# Patient Record
(demographics unavailable — no encounter records)

---

## 2017-03-08 NOTE — HISTORY & PHYSICAL EXAMINATION
DATE OF ADMISSION:  2017

 

PRINCIPAL DIAGNOSIS:  Intrauterine pregnancy at term, fetal macrosomia, prior

 section for breech presentation.

 

PRINCIPAL PROCEDURE:  Repeat low transverse cervical  section.

 

HISTORY OF PRESENT ILLNESS:  The patient is a 31-year-old  2, para

1-0-0-1 white female who presents for repeat  section at 39 weeks. 

Her prior  section was done because of persistent breech presentation

for delivery of a 10-pound 6-ounce viable female infant at 39 weeks.  She now

presents for repeat  section.  This pregnancy was complicated by

pregnancy as a result of IVF.  Last ultrasound for growth showed a macrosomic

infant with normal CARLOZ.  NSTs and AFIs have all been within normal limits. 

The patient is requesting repeat  section.  She understands the risks

of procedure and is willing to proceed.

 

PAST MEDICAL HISTORY:  Significant for history of migraine headaches.

 

PAST SURGICAL HISTORY:  She has had her wisdom teeth removed, a 

section performed in  for breech presentation.  Infant weighed 10 pounds

4 ounces, no complications following the surgery.

 

ALLERGIES:  She has no known drug allergies.

 

OBSTETRICAL AND GYNECOLOGICAL HISTORY:  Periods are regular every 28 days. 

No history of PID, VD or herpes.  Pap smears have been within normal limits,

although she had a colposcopy in .  Pap smears since then have been

normal.  This pregnancy is as a result of IVF done in [place], [place].

 

MEDICATIONS:  Prenatal vitamins.

 

PRENATAL HISTORY:  Blood type is A positive.  Antibody screen is negative. 

Rubella is immune.  Varicella is immune.  RPR is nonreactive.  Hepatitis is

negative.  HIV is negative.  Hemoglobin at 28 weeks was 11.0, hematocrit

32.3.  One-hour Glucola was normal at 16 weeks; however, was elevated at 28

weeks.  Followup 2-hour glucose tolerance test was within normal limits.  GC

and chlamydia were negative.  GBS screen was also negative.  Anatomy scan was

complete.  Ultrasounds for growth as noted above.

 

SOCIAL HISTORY:  She does not smoke or drink.

 

FAMILY HISTORY:  Noncontributory.

 

PHYSICAL EXAMINATION:

GENERAL:  She is a well-nourished, well-developed white female in no apparent

distress.

VITAL SIGNS:  Blood pressure is 112/70.

LUNGS:  Clear to auscultation.

HEART:  Regular rate and rhythm.  No murmurs or gallops.

ABDOMEN:  Gravid, fundal height is 40 cm.  Pelvic exam was deferred.  There

is no hepatosplenomegaly or masses palpable.  She has a well-healed low

transverse skin incision on her abdomen.

EXTREMITIES:  Show trace edema.  No calf tenderness.

 

ASSESSMENT:  A 31-year-old  2, para 1-0-0-1 white female, now presents

for repeat  section.  Prior  section was done because of

persistent breech presentation.  The patient understands the risks of the

procedure, all questions have been answered to her satisfaction.  Please see

the orders for further directions.

## 2017-03-08 NOTE — PAT MEDICATION INSTRUCTIONS
Service Date


Mar 8, 2017.





Current Home Medication List


Ferrous Sulfate (Iron (Ferrous Sulfate)), Unknown Dose 3XWK


Multivit/Min/Iron/Fol Ac/Pren (Prenatal Vitamin), 1 TAB PO HS





Medication Instructions


For Your Scheduled Surgery 





- Take the following medications as scheduled the night before surgery:


Multivit/Min/Iron/Fol Ac/Pren (Prenatal Vitamin), 1 TAB PO HS


Ferrous Sulfate (Iron (Ferrous Sulfate)), Unknown Dose 3XWK (M-W-F)





If you have any questions please call us at [phone] (Melanie De Souza PA-C)

 or [phone] or [phone]

## 2017-03-09 NOTE — [facility] OPERATIVE REPORT
Operative Report


Operative Date


Mar 9, 2017.





Pre-Operative Diagnosis





Previous Caesarean Section for Breech Presentation; Desires Repeat Caesarean 


Section.





Post-Operative Diagnosis


same plus delivery of viable female 10 lbs 10ozs.





Procedure(s) Performed


repeat [place]





Surgeon


Dr. Lieberman





Assistant Surgeon(s)


Dr. Frankel





Estimated Blood Loss


700 ml





Findings


normal tubes & ovaries , gravid term uterus





Fluids


3000





Specimens





Cord Blood





Placenta (Hold)





Drains


Steve to straight drainage, clear urine at end of case





Anesthesia


SAB





Complication(s)


None





Disposition


L&D


I attest to the content of the Intraoperative Record and any orders documented 

therein.  Any exceptions are noted below.

## 2017-03-09 NOTE — OPERATIVE REPORT
DATE OF OPERATION:  2017

 

SURGEON:  Dr. Melanie Lieberman.

 

ASSISTANT:  Dr. Gallito Fox, PGY 1.

 

PREOPERATIVE DIAGNOSES:

1.  Intrauterine pregnancy at 39 weeks.

2.  History of fetal macrosomia, prior  section for breech

presentation.

 

POSTOPERATIVE DIAGNOSES:  Same plus delivery of a viable female infant, 10

pounds 10 ounces.

 

PROCEDURE:  Repeat low transverse  section.

 

ANESTHESIA:  Subarachnoid block.

 

BLOOD LOSS:  700 mL.

 

HISTORY:  The patient is a 31-year-old  2, para 1-0-0-1 white female

who presents for repeat  section at 39 weeks.  Her first 

section was done because of persistent breech presentation, that infant

weighed 10 pounds 6 ounces at 39 weeks.  She now presents for repeat 

section.  She understands the risks of procedure and is willing to proceed.

 

GROSS FINDINGS:  Uterus is gravid and consistent with a term pregnancy in

size.  Bilateral ovaries and fallopian tubes are grossly normal.  The lower

uterine segment was noted to be thin but no obvious window was present.

 

DESCRIPTION OF PROCEDURE:  After the patient received adequate subarachnoid

block, she was prepped and draped in the usual sterile fashion.  A low

transverse skin incision was made with the scalpel and carried to the fascia

with the same scalpel.  The fascial incision was then extended with [place]

scissors and the edges grasped with Kocher clamps and the underlying rectus

muscles bluntly and sharply dissected over the overlying fascia.  The rectus

muscle was bluntly divided along the midline and the underlying peritoneum

elevated bluntly.  The bladder was then taken down off the anterior surface

of the uterus and placed behind the bladder blade.  The lower uterine segment

was entered with the scalpel and extended transversely.  The infant was

delivered from the vertex presentation with moderate fundal pressure.  Mouth

and nasopharynx were suctioned on the perineum.  There was spontaneous crying

and the infant was moving all 4 limbs.  The cord was clamped and cut and the

infant was handed off to Dr. Ponce who was in attendance as pediatrician. 

The placenta was then manually removed.  There were retained membranes

present, these were removed using ring forceps and then ultimately a banjo

curette.  At this point, the uterine cavity was noted to be free of any

placental tissue or membranes.  The uterus was then closed in a running

locking imbricating fashion with 0 Monocryl in 2 layers.  An extension

inferiorly on the right side of the incision was repaired the same way. 

Hemostasis was noted to be excellent at this point.  The posterior cul-de-sac

was irrigated with normal saline.  The incision was examined once more and

continued to have excellent hemostasis.  The uterus was placed back inside

the abdominal cavity.  The gutters were explored and found to be free of clot

and fluid.  The anterior cul-de-sac was then irrigated with normal saline. 

The rectus muscles were then closed in the midline with individual stitches

of 0 Monocryl.  The fascia was closed in a running fashion with 0 Vicryl. 

The adipose layer was then irrigated with normal saline.  The skin edges were

reapproximated using a subcuticular stitch of 4-0 Vicryl.  Urine was clear at

the end of the case.  Mother and infant tolerated the procedure well and was

stable upon arrival in recovery.

 

 

I attest to the content of the Intraoperative Record and any orders documented therein. Any exceptio
ns are noted below.

## 2017-03-10 NOTE — DISCHARGE INSTRUCTIONS
Discharge Instructions


Date of Service


Mar 10, 2017.





Admission


Reason for Admission:  Previous  Section





Discharge


Discharge Diagnosis / Problem:  Spontaneous Vaginal Delivery





Discharge Goals


Goal(s):  Routine recovery after delivery





Medications


Continue Dispensed Medications:  supercream, dermaplast, tucks, lansinoh





Activity Recommendations


Activity Limitations:  per Instructions/Follow-up section





.





Instructions / Follow-Up


Instructions / Follow-Up





ACTIVITY RECOMMENDATIONS:





* Gradual return to full activity over the next 2-3 weeks.


* No lifting - nothing heavier than baby over the next 2-3 weeks.


* Do not engage in vigorous exercise, sexual activity or sports until cleared by


   your physician.


* Do not drive or operate any motorized equipment until cleared by your 

physician.


* You may shower/bathe daily.








MEDICATIONS:





For discomfort or pain, you may use Acetaminophen (Tylenol), Ibuprofen (Advil),


or Naproxen (Aleve) following the package directions. For constipation you may 


use Colace following the package directions.








BREAST CARE:





If you are not breast feeding:





*  Wear a supportive bra 24 hours a day for one to two weeks.


*  Avoid stimulating your breasts and nipples as much as possible during the 

first 


    few weeks after delivery.


*  When taking a shower, have the warm water hit your back, not breasts.


*  When your breasts feel full, apply ice packs.  Usually three to four times a 

day


    helps ease the discomfort.


*  Take a mild pain medication (Tylenol / Motrin) when you are uncomfortable.





If breast feeding:





*  Use breast milk to lubricate nipples.  Lansinoh cream may be used for sore 

nipples. 


    You do not need to remove cream prior to breast feeding.  If using a 

different brand


   of cream, check the label for directions regarding removal of cream prior to 

nursing.


*  Wear a supportive bra.


*  If having problems with breasts or breast feeding, call a lactation 

consultant 


    or your health care provider.








EPISIOTOMY CARE:





After delivery, if you have an episiotomy (stitches), the following steps will 

ease


discomfort and aid healing.





*  For the first 24 hours after delivery, place ice packs next to your 

episiotomy to


   help reduce swelling.


*  After the first 24 hour-period, sitz baths, either portable or in the tub, 

are suggested.


    A shower with a shower arm sprayed over the episiotomy may be comforting.


*  [place] care should be done after each voiding and bowel movement.  Squirt warm 

water


    from a plastic bottle over the perineum (region of the body between the 

anus and 


    urinary opening) and pat dry.


*  Use Dermoplast to ease discomfort.  Shake container.  Spray directly over 

the 


    episiotomy.  Place a Tucks on a clean sanitary pad next to your episiotomy.








SPECIAL CARE INSTRUCTIONS:





When you are discharged from the hospital, it is important for you to follow 

the instructions 


listed below:





*  During the first week at home, you should be able to care for yourself and 

your baby.


    In addition, the usual light household activities are encouraged.





*  Limit your activities to the way you feel.  Do not try to clean the house or 

move 


    furniture. Be sensible.





*  If you actively engage in sports and have done so up until the time of your 

delivery, 


    you may resume these activities as soon as you feel able.  This may take up 

to one 


    month or even longer.  Use good judgment.





*  Continue to take your prenatal vitamins for at least six weeks after the 

birth of your baby.





*  Your diet need not be limited unless you were on a special diet before your 

delivery.  


    Breast-feeding mothers need around 2500 calories per day and at least 64-80 

ounces of 


    fluid per day (8 to 10 glasses).





*  You should eat foods from the four major food groups.  Crash diets or fad 

diets are to be 


    avoided.  Eating lean meats, fresh fruits and vegetables, low-fat dairy 

products, high fiber


    foods and a regular exercise program, will help you get back to your pre-

pregnancy weight


    without putting your health at risk.





*  Constipation is sometimes a problem after delivery.  Take a mild laxative as 

needed.  If 


    breast feeding, Milk of Magnesia is acceptable to use. You may use a 

suppository or Fleets


    enema if no episiotomy.





*  A daily shower or tub bath is suggested.  Be sure to thoroughly and gently 

dry the perineum.





*  A bloody vaginal discharge will usually continue until around four weeks 

post partum.  A 


    small amount of bleeding may continue for as long as six weeks.  Vaginal 

discharge changes


    from the bright red bleeding after delivery to pink then brownish and 

finally yellowish-pink 


    before becoming white and disappearing.





*  Bleeding may increase with activity.  Your first period may come in 4-8 

weeks.  If you are 


    breast feeding, your period may be delayed even longer.





*  [place] (sex) can begin whenever both you and your partner feel 

comfortable and do 


    not have any form of genital infection.  It is recommended that you wait at 

least six weeks


    for internal and external healing to occur.  If you have questions, please 

talk to your health


    care practitioner.  A condom should be used to prevent infection and 

pregnancy.





*  Foreplay, gentle intercourse and lubrication is very important the first 

several times to 


    prevent pain.  A water-based lubricant such as K-Y jelly or Astroglide may 

be used.





*  If you have RH negative blood and your baby is RH positive, you will receive 

RHOGAM by 


    injection prior to discharge.  The nurse will give you a card to keep with 

you that has the


    date and place that you received RHOGAM after delivery.





*  During your prenatal care, you had a Rubella screen done to check for the 

presence of 


    rubella antibodies in your blood.  If your test was negative, you will 

receive a Rubella 


    vaccine prior to discharge.  This vaccine may cause a fever, soreness at 

the injection site


    and flu-like symptoms.  If these symptoms persist, notify your health care 

practitioner.  


    Pregnancy is not advised for one month after a Rubella vaccine.





*  Verbalizes understanding of car seat law as reviewed with patient nursing.





*  Car Seat hand-out given and reviewed with patient by nursing.





*  Shaken baby information reviewed with patient by nursing.


 


Call you doctor if:





*  Heavy bleeding (saturating several pads an hour) or passing clots the size 

of your fist.


*  A fever >101 degrees F (38.3 degrees C) on two occasions four hours apart and

/or chills.


*  Unusual pain in the pelvic or vaginal areas.


* "Baby Blues" lasting longer than two weeks.





If you have any questions or concerns, call your health care practitioner at 


[phone].








FOLLOW UP VISIT:





*  Please call the office at [phone] to schedule a 6 week postpartum 


    examination.  It is important you keep this appointment.  It is important 


    for you to make arrangements for either yearly or twice yearly check-ups 


    thereafter.





Current Hospital Diet


Patient's current hospital diet: Regular OB Diet





Discharge Diet


Recommended Diet:  Regular Diet





Procedures


Procedures Performed:  


Live Female Infant at 0754





Pending Studies


Studies pending at discharge:  no





Medical Emergencies








.


Who to Call and When:





Medical Emergencies:  If at any time you feel your situation is an emergency, 

please call 911 immediately.





.





Non-Emergent Contact


Non-Emergency issues call your:  Primary Care Provider, Gynecologist





.


.





"Provider Documentation" section prepared by Daniel Frankel.





VTE Core Measure


Inpt VTE Proph given/why not?:  Treatment not indicated

## 2017-03-10 NOTE — PROGRESS NOTE
Subjective


Mar 10, 2017.


Subjective


conversation w/ patient, physical exam


Ambulation:  ambulating normally


Voiding:  no voiding problems


Passing Gas:  Yes


Diet Tolerance:  Regular Diet


Lochia:  Small


Feeding Type:  Breast Feeding





Review of Systems


Constitutional:  + chills, + fever


Respiratory:  + cough, + shortness of breath


Cardiac:  + chest pain


Abdomen:  + nausea, + pain, + vomiting





Objective


Vital Signs











  Date Time  Temp Pulse Resp B/P Pulse Ox O2 Delivery O2 Flow Rate FiO2


 


3/10/17 04:00 36.6 70 18 110/70  Room Air  


 


3/10/17 00:01     99 Room Air  


 


3/10/17 00:01   18  99   


 


3/10/17 00:01 36.7 76 18 106/63 99 Room Air  


 


3/9/17 23:00   18  99   


 


3/9/17 22:00   20  99   


 


3/9/17 21:00   18  99   


 


3/9/17 20:00 36.7 73 20 108/67 99 Room Air  


 


3/9/17 20:00   18  99   


 


3/9/17 18:00   20  97   


 


3/9/17 17:00   20  97   


 


3/9/17 16:00   19  97   


 


3/9/17 15:30     99 Room Air  


 


3/9/17 15:30 36.7 75 20 121/80 97 Room Air  


 


3/9/17 15:00   18  99   


 


3/9/17 15:00   20  97   


 


3/9/17 14:05   18  99   


 


3/9/17 13:05   20  99   


 


3/9/17 12:10 36.9 69 18 116/76    


 


3/9/17 12:05   18  99   


 


3/9/17 11:05      Room Air  


 


3/9/17 11:05 36.9 64 18 128/81  Room Air  


 


3/9/17 11:05   18  99   











Physical Exam


General Appearance:  WELL-APPEARING, NO APPARENT DISTRESS


Respiratory/Chest:  lungs clear, normal breath sounds


Cardiovascular:  regular rate, rhythm, no murmur


Abdomen:  non tender, soft


Fundus:  Firm, Non-Tender, Relation to Umbilicus (2 cm below)


Incision Description:  Clean, Dry & Intact


Extremities:  non-tender, no calf tenderness





Laboratory Results





Last 24 Hours








Test


  3/10/17


05:55


 


Hemoglobin 10.6 g/dL 


 


Hematocrit 32.2 % 











Assessment and Plan


Post-Op


Day#:  1


Continue Routine Care:


Resident Physician Supervision Note:


I interviewed and examined the patient. Discussed with Dr. Frankel and agree 

with findings and plan as documented in the note. Any exceptions or 

clarifications are listed here: [None]





Documented By:  Melanie Lieberman


S/DENIS  Day 1





- vital signs reviewed and wnl


- HGB 10.6 today


- Blood: A+, GBS-, Rubella immune


- Encourage ambulation, encourage breast feeding, and monitor lochia


- Patient doing well clinically


- CONTINUE ROUTINE POST C- Section CARE

## 2017-03-11 NOTE — PROGRESS NOTE
Subjective


Mar 11, 2017.


Subjective


conversation w/ patient


Ambulation:  ambulating normally


Voiding:  no voiding problems


Passing Gas:  Yes


Diet Tolerance:  Regular Diet


Lochia:  Small


Feeding Type:  Breast Feeding





Review of Systems


Constitutional:  No chills, No fever


Respiratory:  No cough, No shortness of breath


Cardiac:  No chest pain, No palpitations





Objective


Vital Signs











  Date Time  Temp Pulse Resp B/P Pulse Ox O2 Delivery O2 Flow Rate FiO2


 


3/10/17 23:10 36.7 81 16 123/83 97 Room Air  


 


3/10/17 23:10     97 Room Air  


 


3/10/17 15:30     96 Room Air  


 


3/10/17 15:30 36.8 72 18 121/80  Room Air  


 


3/10/17 11:10 36.7 72 14 112/70 98 Room Air  


 


3/10/17 08:45      Room Air  


 


3/10/17 07:30 36.8 70 16 109/66 96 Room Air  











Physical Exam


General Appearance:  WELL-APPEARING, NO APPARENT DISTRESS


Respiratory/Chest:  lungs clear, no respiratory distress


Cardiovascular:  regular rate, rhythm, no murmur


Abdomen:  non tender, soft


Fundus:  Firm, Non-Tender, Relation to Umbilicus (2 cm below)


Incision Description:  Clean, Dry & Intact


Extremities:  non-tender, no calf tenderness





Laboratory Results





Last 24 Hours








Test


  3/11/17


06:00











Assessment and Plan


Post-Op


Day#:  2


Continue Routine Care:


S/P  Day 2





- vital signs reviewed and wnl


- HGB 10.6 yesterday


- Blood: A+, GBS-, Rubella immune


- Encourage ambulation, encourage breast feeding, and monitor lochia


- Patient doing well clinically


- Reviewed discharge instructions with patient


- PATIENT TO BE DISCHARGED TODAY





Resident Physician Supervision Note:


I interviewed and examined the patient. Discussed with Dr. Frankel and agree 

with findings and plan as documented in the note. Any exceptions or 

clarifications are listed here: [None]





Documented By:  Gabriel Herbert

## 2017-03-11 NOTE — DISCHARGE SUMMARY
DATE OF DISCHARGE:  2017.  

 

PRINCIPAL DIAGNOSIS:  Intrauterine pregnancy at 39 weeks, prior 

section and history of fetal macrosomia.

 

PRINCIPAL PROCEDURE:  Repeat low transverse cervical  section.

 

HISTORY OF PRESENT ILLNESS:  The patient is a 31-year-old  2, para

1-0-0-1 white female who presented at 39 weeks for repeat  section. 

This was done without complications with delivery of a viable female infant,

10 pounds 10 ounces.  She had an uncomplicated postop course.  She remained

afebrile throughout her hospital stay.  Pain  was well controlled with p.o.

pain meds.  Hemoglobin on admission was 11.5, hematocrit 34.0.  Second postop

day hemoglobin 9.6, hematocrit 29.3.  She was sent home in good condition

with prescriptions for Percocet 1-2 tablets p.o. q. 4 hours p.r.n. pain,

Motrin 600 mg p.o. q. 4 hours p.r.n. pain.  She is to call for a temperature

of 101 degrees or higher, heavy vaginal bleeding, burning with urination,

increased redness, drainage or pain from her incision, calf tenderness or any

other concerns.  She will be seen in the office in 6 weeks for postpartum and

postop exam.

## 2018-04-27 NOTE — EMERGENCY ROOM VISIT NOTE
History


First contact with patient:  05:52


Chief Complaint:  VOMITING


Stated Complaint:  VOMITING





History of Present Illness


The patient is a 32 year old female who presents to the Emergency Room with 

complaints of nausea, vomiting, diarrhea for the past 7 hours.  Her children 

have been sick recently.  No bad food exposure.  No well water.  No recent 

antibiotics.  Patient denies chest pain, dyspnea, fever, chills, localized 

abdominal pain, blood or black in the emesis or stool, urinary problems.  

Patient cannot keep fluids down.





Review of Systems


An 10 system review of systems was completed with positives and pertinent 

negatives listed in the HPI.





Past Medical/Surgical History


Medical Problems:


(1) Pregnancy with 39 completed weeks gestation


Surgical Problems:


(1) Previous  section complicating pregnancy, antepartum condition or 

complication








Social History


Smoking Status:  Never Smoker


Smokeless Tobacco Use:  No


Alcohol Use:  none


Drug Use:  none


Marital Status:  


Housing Status:  lives with family


Occupation Status:  employed





Current/Historical Medications


No Active Prescriptions or Reported Meds





Physical Exam


Vital Signs











  Date Time  Temp Pulse Resp B/P (MAP) Pulse Ox O2 Delivery O2 Flow Rate FiO2


 


18 08:23  88 16 108/75 100   


 


18 07:44  79  108/64 99 Room Air  


 


18 05:46 36.7 89 20 114/77 98 Room Air  











Physical Exam


VITALS: Vitals are noted on the nurse's note and reviewed by myself.  Vital 

signs stable.


GENERAL: Pleasant female actively vomiting, in no acute distress, nondiaphoretic

, well-developed well-nourished.


SKIN: Capillary reflex less than 2 seconds.


HEENT: Normocephalic.  PERRLA.  EOMI.  Nares patent.  Mucous membranes mildly 

dry.  Neck is supple without nuchal rigidity.


HEART: Regular rate and rhythm without murmurs gallops or rubs.


LUNGS: Clear to auscultation bilaterally without wheezes, rales or rhonchi.  No 

retractions or accessory muscle use.


ABDOMEN: Positive bowel sounds x 4.  Normal tympanic percussion.  Soft, 

nontender, without masses or organomegaly. Hameed sign negative.  No guarding 

or rebound tenderness.no CVA tenderness


MUSCULOSKELETAL: No gross musculoskeletal defects.  


NEURO: Patient was alert and oriented to person place and time.  Normal 

sensation to light and sharp touch.    No focal neurological deficits.





Medical Decision & Procedures


Laboratory Results


18 06:01








Red Blood Count 4.75, Mean Corpuscular Volume 88.4, Mean Corpuscular Hemoglobin 

30.5, Mean Corpuscular Hemoglobin Concent 34.5, Mean Platelet Volume 9.4, 

Neutrophils (%) (Auto) 91.5, Lymphocytes (%) (Auto) 3.2, Monocytes (%) (Auto) 

4.9, Eosinophils (%) (Auto) 0.1, Basophils (%) (Auto) 0.0, Neutrophils # (Auto) 

9.99, Lymphocytes # (Auto) 0.35, Monocytes # (Auto) 0.54, Eosinophils # (Auto) 

0.01, Basophils # (Auto) 0.00





18 06:01

















Test


  18


06:01


 


White Blood Count


  10.92 K/uL


(4.8-10.8)


 


Red Blood Count


  4.75 M/uL


(4.2-5.4)


 


Hemoglobin


  14.5 g/dL


(12.0-16.0)


 


Hematocrit 42.0 % (37-47) 


 


Mean Corpuscular Volume


  88.4 fL


()


 


Mean Corpuscular Hemoglobin


  30.5 pg


(25-34)


 


Mean Corpuscular Hemoglobin


Concent 34.5 g/dl


(32-36)


 


Platelet Count


  257 K/uL


(130-400)


 


Mean Platelet Volume


  9.4 fL


(7.4-10.4)


 


Neutrophils (%) (Auto) 91.5 % 


 


Lymphocytes (%) (Auto) 3.2 % 


 


Monocytes (%) (Auto) 4.9 % 


 


Eosinophils (%) (Auto) 0.1 % 


 


Basophils (%) (Auto) 0.0 % 


 


Neutrophils # (Auto)


  9.99 K/uL


(1.4-6.5)


 


Lymphocytes # (Auto)


  0.35 K/uL


(1.2-3.4)


 


Monocytes # (Auto)


  0.54 K/uL


(0.11-0.59)


 


Eosinophils # (Auto)


  0.01 K/uL


(0-0.5)


 


Basophils # (Auto)


  0.00 K/uL


(0-0.2)


 


RDW Standard Deviation


  40.7 fL


(36.4-46.3)


 


RDW Coefficient of Variation


  12.6 %


(11.5-14.5)


 


Immature Granulocyte % (Auto) 0.3 % 


 


Immature Granulocyte # (Auto)


  0.03 K/uL


(0.00-0.02)


 


Anion Gap


  6.0 mmol/L


(3-11)


 


Est Creatinine Clear Calc


Drug Dose 80.6 ml/min 


 


 


Estimated GFR (


American) 76.1 


 


 


Estimated GFR (Non-


American 65.7 


 


 


BUN/Creatinine Ratio 21.4 (10-20) 


 


Calcium Level


  9.1 mg/dl


(8.5-10.1)


 


Human Chorionic Gonadotropin,


Qual NEG (NEG) 


 











Medications Administered











 Medications


  (Trade)  Dose


 Ordered  Sig/Barbie


 Route  Start Time


 Stop Time Status Last Admin


Dose Admin


 


 Sodium Chloride  2,000 ml @ 


 999 mls/hr  Q2H1M STAT


 IV  18 05:54


 18 07:54 DC 18 06:00


999 MLS/HR


 


 Dicyclomine HCl


  (Bentyl Inj)  20 mg  NOW  ONCE


 IM  18 06:00


 18 06:01 DC 18 06:07


20 MG


 


 Metoclopramide HCl


  (Reglan Inj)  10 mg  NOW  STAT


 IV  18 05:54


 18 05:55 DC 18 06:07


10 MG


 


 Diphenhydramine


 HCl


  (Benadryl Inj)  12.5 mg  NOW  STAT


 IV  18 05:54


 18 05:55 DC 18 06:07


12.5 MG


 


 Dicyclomine HCl


  (Dicyclomine HCl


 10MG Home Pack)  1 ea  UD  ONCE


 PO  18 06:15


 18 06:16 DC 18 06:51


1 EA


 


 Ondansetron HCl


  (ZOFRAN ODT 4MG


 Home Pack)  1 homepack  UD  ONCE


 PO  18 06:15


 18 06:16 DC 18 06:51


1 HOMEPACK











ED Course


Prior records/ancillary studies reviewed.


Triage Nursing notes reviewed.





The patient's history was concerning for nausea, vomiting, diarrhea, and 

abdominal pain.





Differential diagnosis:


Etiologies such as gastroenteritis, food borne illness, infections, appendicitis

, diverticulitis, inflammatory bowel disease, obstruction, GI bleed, biliary 

pathology, as well as others were entertained.





Physical examination findings:


As above.  Abdominal examination revealed no tenderness.  Vital signs reviewed 

and revealed stable.





ER treatment provided:


IV hydration 2 L NSS.


Toby Qiu Benadryl


On reassessment the patient felt better. Patient was tolerating p.o. intake.





Diagnostics interpretation by me:


The labs revealed mild leukocytosis most likely marginalization from vomiting.  

Negative hCG.


Hyperglycemia without DKA.  This could be a stress reaction from the infection





This appears to be consistent with vomiting and diarrhea most likely viral in 

etiology.  Other family members are sick.  Patient did not have an acute 

abdomen on exam.  Patient was informed to have her blood sugar rechecked in a 

week with family care doctor as is elevated today.  She was able to tolerate 

fluids.  She is advised to clear liquid diet today and progress as tolerated to 

bland diet tomorrow.  She is advised to take medications as directed.  She is 

advised to follow-up with family care in a few days here in the ER sooner for 

abdominal pain, vomiting, black or blood in stool or vomit, worsening signs or 

symptoms or as needed. By the evaluation outlined above emergent etiologies 

such as appendicitis, diverticulitis, obstruction, cardiac sources, mesenteric 

ischemia, aortic pathology, inflammatory bowel disease, renal colic, PUD, 

biliary pathology, UTI, as well as others were deemed relatively unlikely.





The pt informed about the findings as listed above. All questions were answered 

and  pleased with the treatment. Return instructions were outlined and the 

patient was discharged in stable condition.





Outpatient prescription management:


Bentyl, Zofran





Referral:


The patient was referred to their primary care physician for follow-up in 2 to 

3 days for a recheck of the current condition.





The chart was completed utilizing Dragon Speech voice recognition software.   

Grammatical errors, random word insertions, pronoun errors, and incomplete 

sentences are an occassional consequence of this system due to software 

limitations, ambient noise, and hardware issues.  Any formal questions or 

concerns about the content, text, or information contained within the body of 

this dictation should be directly addressed to the physician assistant for 

clarification.





Medical Decision


As above





Medication Reconcilliation


Current Medication List:  was personally reviewed by me





Blood Pressure Screening


Patient's blood pressure:  Normal blood pressure





Impression





 Primary Impression:  


 Nausea, vomiting, and diarrhea


 Additional Impression:  


 Hyperglycemia





Departure Information


Dispostion


Home / Self-Care





Condition


GOOD





Prescriptions





No Active Prescriptions or Reported Meds





Referrals


No Doctor, Assigned (PCP)





Patient Instructions


My [facility] [employer]





Additional Instructions





DO NOT drive, drink alcohol, operate machinery, or perform dangerous activities 

today.  You were given medications in the ER that can affect your ability to 

safely function or operate a vehicle.





Your blood sugar is elevated today.  Recheck this with family care doctor in a 

week.





Bentyl tablets 10mg:  Take one every six hours as needed for abdominal 

cramping. Avoid alcohol, operating machinery or dangerous equipment, working on 

ladders or roofs, DRIVING, or situations where being under the influence may be 

dangerous.





Zofran(odansetron) tablets 4mg:  Take one and allow it to dissolve in your 

mouth every four to six hours as needed for nausea or vomiting.  





Rest and drink plenty of fluids as tolerated.  Slow sips of water or sports 

drinks are recommended instead of large amounts all at once. 


 


Continue current medications.





Once your stomach is settled start with a clear liquid diet (jello, soup broth, 

etc.) and then advance as tolerated.  You should avoid full, heavy meals for 

about 24 hrs from the time your symptoms resolved.  





Return to the ER for persistent vomiting, fevers, abdominal pain, chest pains, 

difficulty breathing, black or bloody stools, worsening of your condition, or 

as needed.





Follow up with your primary physician in 2-3 days for a recheck of your current 

condition.





Problem Qualifiers

## 2018-08-17 NOTE — ANESTHESIOLOGY PROGRESS NOTE
Anesthesia Post Op Note


Date & Time


Mar 9, 2017 at 14:55





Vital Signs


Pain Intensity:  0.0





 Vital Signs Past 12 Hours








  Date Time  Temp Pulse Resp B/P Pulse Ox O2 Delivery O2 Flow Rate FiO2


 


3/9/17 12:10 36.9 69 18 116/76    


 


3/9/17 11:05      Room Air  


 


3/9/17 11:05 36.9 64 18 128/81  Room Air  


 


3/9/17 11:05   18  99   











Notes


Mental Status:  alert / awake / arousable, participated in evaluation


Pt Amnestic to Procedure:  Yes


Nausea / Vomiting:  adequately controlled


Pain:  adequately controlled


Airway Patency, RR, SpO2:  stable & adequate


BP & HR:  stable & adequate


Hydration State:  stable & adequate


Anesthetic Complications:  no major complications apparent
diarrhea